# Patient Record
Sex: FEMALE | Race: WHITE | NOT HISPANIC OR LATINO | URBAN - METROPOLITAN AREA
[De-identification: names, ages, dates, MRNs, and addresses within clinical notes are randomized per-mention and may not be internally consistent; named-entity substitution may affect disease eponyms.]

---

## 2024-07-25 ENCOUNTER — OFFICE VISIT (OUTPATIENT)
Dept: BEHAVIORAL HEALTH | Facility: CLINIC | Age: 39
End: 2024-07-25
Payer: COMMERCIAL

## 2024-07-25 VITALS
WEIGHT: 163.8 LBS | HEIGHT: 68 IN | DIASTOLIC BLOOD PRESSURE: 76 MMHG | SYSTOLIC BLOOD PRESSURE: 124 MMHG | BODY MASS INDEX: 24.83 KG/M2

## 2024-07-25 DIAGNOSIS — Z79.899 MEDICATION MANAGEMENT: ICD-10-CM

## 2024-07-25 DIAGNOSIS — F90.2 ATTENTION DEFICIT HYPERACTIVITY DISORDER, COMBINED TYPE: Primary | ICD-10-CM

## 2024-07-25 RX ORDER — THYROID 60 MG/1
1 TABLET ORAL DAILY
COMMUNITY
End: 2024-07-25

## 2024-07-25 RX ORDER — ERGOCALCIFEROL 1.25 MG/1
CAPSULE ORAL
COMMUNITY
Start: 2024-06-14

## 2024-07-25 RX ORDER — THYROID 30 MG/1
1 TABLET ORAL DAILY
COMMUNITY
End: 2024-07-25

## 2024-07-25 RX ORDER — OSELTAMIVIR PHOSPHATE 75 MG/1
CAPSULE ORAL
COMMUNITY
End: 2024-07-25

## 2024-07-25 NOTE — PROGRESS NOTES
Chief Complaint:  ADHD    History of Present Illness: Malaika Espitia is a 39 y.o. female who presents today referred by PCP Anisa ESPARZA. Pt denies feeling depressed. No anhedonia or hopelessness. Pt denies having SI or HI. Pt does have access to firearms. No h/o suicide attempts or self harm. No difficulty sleeping. No symptoms of leslie/hypomania. No excessive worrying. No irritability or feeling on edge. No symptoms of OCD or PTSD. Pt denies AVH. H/o ADHD. Pt is easily forgetful, often misplacing and losing items. Pt c/o difficulty completing tasks, being easily distracted. Pt feels like her mind is very scattered, sometimes feeling overstimulated with lots of noise or with external input. Pt has had difficulty concentrating since childhood. Pt admits to excessive talking, would often move around and be out of her seat during school. Pt admits to procrastination since childhood. Pt had A's and B's in elementary and high school. Pt c/o difficulty retaining information. Pt has had testing accommodations for her board exams with ADHD that include extended time and in a quiet room. Pt reports her  pushed her to make this appointment due to severity of her ADHD being uncontrolled. Pt reports feeling overwhelmed with unaccomplished tasks. Pt reports ADHD symptoms affect her at both home and work.       Medical Record Review: Reviewed office visit note from 4/19/24, discuss referral for behavioral health for ADHD treatment. Was on Adderall XR 20mg prior to having children. Stopped for pregnancy/breast feeding. Provider was in [place], needs someone closer. Pt c/o difficulty concentrating. She has been off meds since 12/2015. H/o hypothyroidism.       PHQ-9 Depression Screening  Little interest or pleasure in doing things? 0-->not at all   Feeling down, depressed, or hopeless? 0-->not at all   Trouble falling or staying asleep, or sleeping too much? 0-->not at all   Feeling tired or having little energy?  1-->several days   Poor appetite or overeating? 0-->not at all   Feeling bad about yourself - or that you are a failure or have let yourself or your family down? 0-->not at all   Trouble concentrating on things, such as reading the newspaper or watching television? 3-->nearly every day   Moving or speaking so slowly that other people could have noticed? Or the opposite - being so fidgety or restless that you have been moving around a lot more than usual? 1-->several days   Thoughts that you would be better off dead, or of hurting yourself in some way? 0-->not at all   PHQ-9 Total Score 5   If you checked off any problems, how difficult have these problems made it for you to do your work, take care of things at home, or get along with other people? not difficult at all         NILO-7  Feeling nervous, anxious or on edge: Several days  Not being able to stop or control worrying: Not at all  Worrying too much about different things: Not at all  Trouble Relaxing: Several days  Being so restless that it is hard to sit still: Not at all  Feeling afraid as if something awful might happen: Not at all  Becoming easily annoyed or irritable: Several days  NILO 7 Total Score: 3      ROS:  Review of Systems   Constitutional:  Positive for fatigue. Negative for appetite change, diaphoresis and unexpected weight change.   HENT:  Negative for drooling, tinnitus and trouble swallowing.    Eyes:  Negative for visual disturbance.   Respiratory:  Negative for cough, chest tightness and shortness of breath.    Cardiovascular:  Negative for chest pain and palpitations.   Gastrointestinal:  Negative for abdominal pain, constipation, diarrhea, nausea and vomiting.   Endocrine: Negative for cold intolerance and heat intolerance.   Genitourinary:  Negative for difficulty urinating.   Musculoskeletal:  Negative for arthralgias and myalgias.   Skin:  Negative for rash.   Allergic/Immunologic: Negative for immunocompromised state.   Neurological:   Negative for dizziness, tremors, seizures and headaches.   Psychiatric/Behavioral:  Positive for decreased concentration. Negative for agitation, dysphoric mood, hallucinations, self-injury, sleep disturbance and suicidal ideas. The patient is not nervous/anxious.        Problem List:  There is no problem list on file for this patient.      Current Medications:   Current Outpatient Medications   Medication Sig Dispense Refill    vitamin D (ERGOCALCIFEROL) 1.25 MG (49065 UT) capsule capsule TAKE 1 capsule BY MOUTH ONCE WEEKLY ON THE SAME DAY       No current facility-administered medications for this visit.       Discontinued Medications:  Medications Discontinued During This Encounter   Medication Reason    oseltamivir (TAMIFLU) 75 MG capsule *Therapy completed    Thyroid (NP THYROID) 30 MG PO tablet *Therapy completed    Thyroid (NP THYROID) 60 MG PO tablet *Therapy completed       Allergy:   No Known Allergies     Past Medical History:  Past Medical History:   Diagnosis Date    ADHD (attention deficit hyperactivity disorder)     Disease of thyroid gland        Past Surgical History:  Past Surgical History:   Procedure Laterality Date     SECTION         Past Psychiatric History:  Began Treatment:   Diagnoses: ADHD (diagnosed by neuropsych testing and psychiatrist)  Psychiatrist: Pt last saw a psychiatrist in .  Therapist: Pt last did therapy in .   Admission History: Denies  Medications/Treatment: Strattera, Adderall  Self Harm: Denies  Suicide Attempts: Denies  Postpartum depression: Denies    Family Psychiatric History:   Diagnoses: Denies  Substance use: Denies  Suicide Attempts/Completions: Denies    Family History   Problem Relation Age of Onset    No Known Problems Mother     No Known Problems Father     No Known Problems Sister     No Known Problems Brother     No Known Problems Maternal Aunt     No Known Problems Paternal Aunt     No Known Problems Maternal Uncle     No Known  Problems Paternal Uncle     No Known Problems Maternal Grandfather     No Known Problems Maternal Grandmother     No Known Problems Paternal Grandfather     No Known Problems Paternal Grandmother     No Known Problems Cousin     No Known Problems Other     ADD / ADHD Neg Hx     Alcohol abuse Neg Hx     Anxiety disorder Neg Hx     Bipolar disorder Neg Hx     Dementia Neg Hx     Depression Neg Hx     Drug abuse Neg Hx     OCD Neg Hx     Paranoid behavior Neg Hx     Schizophrenia Neg Hx     Seizures Neg Hx     Self-Injurious Behavior  Neg Hx     Suicide Attempts Neg Hx        Substance Abuse History:   Alcohol use: Pt will drink 2 glasses of wine a week.   Nicotine: Denies  Illicit Drug Use: Denies  Longest Period Sober: Denies  Rehab/AA/NA: Denies    Social History:  Living Situation: Pt lives with her  and 4 children.   Marital/Relationship History: 10 years+ with . No abuse or trauma.   Children: 7 year old son, 5 year old son, 3 year old daughter, 1 year old daughter.   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   Education: Pt completed high school, has her doctorate's degree.    History: Denies  Legal: Denies    Social History     Socioeconomic History    Marital status:    Tobacco Use    Smoking status: Never    Smokeless tobacco: Never   Vaping Use    Vaping status: Never Used   Substance and Sexual Activity    Alcohol use: Yes     Comment: couple times a week    Drug use: Never    Sexual activity: Yes     Partners: Male       Developmental History:   Place of birth: [place]  Siblings: 2 brothers.   Childhood: Unremarkable. No h/o abuse, trauma, or neglect.       Physical Exam:  Physical Exam    Appearance: Well-groomed with adequate hygiene, appears to be of stated age. Casually and neatly dressed, maintains good eye contact.   Behavior: Appropriate, cooperative. No acute distress.  Motor: No abnormal movements, tics or tremors are noted. No psychomotor agitation or  "retardation.  Speech: Coherent, spontaneous, appropriate with normal rate, volume, rhythm, and tone. Increased reaction time to questions, interrupted before I finished asking question at start of visit. Hyperverbal  Mood: \"I'm good\"  Affect: Full range, appropriate, congruent with spontaneous emotional reactivity. Normal intensity. No emotional blunting.   Thought content: Negative suicidal ideations, negative homicidal ideations. Patient denies any obsession, compulsion, or phobia. No evidence of delusions.  Perceptions: Negative auditory hallucinations, negative visual hallucinations. Pt does not appear to be actively responding to internal stimuli.   Thought process: Logical, goal-directed, coherent, and linear with no evidence of flight of ideas, looseness of associations, thought blocking, or tangentiality. Circumstantiality   Insight/Judgement: Fair/fair  Cognition: Alert and oriented to person, place, and date. Memory intact for recent and remote events. Attention and concentration intact.     Vital Signs:   /76   Ht 172.7 cm (68\")   Wt 74.3 kg (163 lb 12.8 oz)   BMI 24.91 kg/m²      Lab Results:   No visits with results within 12 Month(s) from this visit.   Latest known visit with results is:   No results found for any previous visit.       EKG Results:  ECG 12 Lead    (Results Pending)       Imaging Results:  No Images in the past 120 days found..      Assessment & Plan   Diagnoses and all orders for this visit:    1. Attention deficit hyperactivity disorder, combined type (Primary)    2. Medication management  -     Urine Drug Screen - Urine, Clean Catch; Standing  -     ECG 12 Lead      Patient screened positive for depression based on a PHQ-9 score of 5 on 7/25/2024. Follow-up recommendations include: Suicide Risk Assessment performed and see assessment below .    Presentation seems distant with ADHD.  Patient's reported history, symptoms, presentation/physical exam findings are very consistent " for history of ADHD.  We will plan to start on Adderall XR 15 mg for management of ADHD.  Discussed needing to obtain urine drug screen and EKG and if appropriate will then send medication.  Patient verbalized understanding is agreeable to this plan.  Instructed patient to contact the office for any new or worsening symptoms or any other concerns.  Follow-up in 1 month.  Addressed all questions and concerns.      Visit Diagnoses:    ICD-10-CM ICD-9-CM   1. Attention deficit hyperactivity disorder, combined type  F90.2 314.01   2. Medication management  Z79.899 V58.69       PLAN:  Safety: No acute safety concerns at this time.  Therapy: Declines  Risk Assessment: Risk of self-harm acutely is low to moderate.  Risk factors include mood disorder, access to firearms, and recent psychosocial stressors (pandemic). Protective factors include no family history, no present SI, no history of suicide attempts or self-harm in the past, minimal AODA, healthcare seeking, future orientation, willingness to engage in care.  Risk of self-harm chronically is also low to moderate, but could be further elevated in the event of treatment noncompliance and/or AODA.  Labs/Diagnostics Ordered:   Orders Placed This Encounter   Procedures    Urine Drug Screen - Urine, Clean Catch    ECG 12 Lead     Medications:   No orders of the defined types were placed in this encounter.      Adderall, Risks, benefits, side effects discussed with patient including elevated heart rate, elevated blood pressure, irritability, insomnia, sexual dysfunction, appetite suppressing properties, psychosis.  After discussion of these risks and benefits, the patient voiced understanding and agreed to proceed. Remigio reviewed, UDS ordered, and controlled substance agreement signed & witnessed.      Follow up: F/u in 1 month.       TREATMENT PLAN/GOALS: Continue supportive psychotherapy efforts and medications as indicated. Treatment and medication options discussed  during today's visit. Patient ackowledged and verbally consented to continue with current treatment plan and was educated on the importance of compliance with treatment and follow-up appointments.    MEDICATION ISSUES:  SUZI reviewed as expected.  Discussed medication options and treatment plan of prescribed medication as well as the risks, benefits, and side effects including potential falls, possible impaired driving and metabolic adversities among others. Patient is agreeable to call the office with any worsening of symptoms or onset of side effects. Patient is agreeable to call 911 or go to the nearest ER should he/she begin having SI/HI. No medication side effects or related complaints today.            This document has been electronically signed by Cecilia Samuel PA-C  July 25, 2024 09:18 EDT      Part of this note may be an electronic transcription/translation of spoken language to printed text using the Dragon Dictation System.

## 2024-07-26 ENCOUNTER — TRANSCRIBE ORDERS (OUTPATIENT)
Dept: ADMINISTRATIVE | Facility: HOSPITAL | Age: 39
End: 2024-07-26
Payer: COMMERCIAL

## 2024-07-26 ENCOUNTER — LAB (OUTPATIENT)
Dept: LAB | Facility: HOSPITAL | Age: 39
End: 2024-07-26
Payer: COMMERCIAL

## 2024-07-26 ENCOUNTER — HOSPITAL ENCOUNTER (OUTPATIENT)
Dept: CARDIOLOGY | Facility: HOSPITAL | Age: 39
Discharge: HOME | End: 2024-07-26
Payer: COMMERCIAL

## 2024-07-26 DIAGNOSIS — Z79.899 MEDICATION MANAGEMENT: Primary | ICD-10-CM

## 2024-07-26 DIAGNOSIS — Z79.899 MEDICATION MANAGEMENT: ICD-10-CM

## 2024-07-26 LAB
AMPHET+METHAMPHET UR QL: NEGATIVE
BARBITURATES UR QL SCN: NEGATIVE
BENZODIAZ UR QL SCN: NEGATIVE
CANNABINOIDS SERPL QL: NEGATIVE
COCAINE UR QL: NEGATIVE
FENTANYL UR-MCNC: NEGATIVE NG/ML
METHADONE UR QL SCN: NEGATIVE
OPIATES UR QL: NEGATIVE
OXYCODONE UR QL SCN: NEGATIVE
QT INTERVAL: 371 MS
QTC INTERVAL: 398 MS

## 2024-07-26 PROCEDURE — 80307 DRUG TEST PRSMV CHEM ANLYZR: CPT

## 2024-07-26 PROCEDURE — 93005 ELECTROCARDIOGRAM TRACING: CPT | Performed by: PHYSICIAN ASSISTANT

## 2024-07-30 DIAGNOSIS — F90.2 ATTENTION DEFICIT HYPERACTIVITY DISORDER, COMBINED TYPE: Primary | ICD-10-CM

## 2024-07-30 RX ORDER — DEXTROAMPHETAMINE SACCHARATE, AMPHETAMINE ASPARTATE MONOHYDRATE, DEXTROAMPHETAMINE SULFATE AND AMPHETAMINE SULFATE 3.75; 3.75; 3.75; 3.75 MG/1; MG/1; MG/1; MG/1
15 CAPSULE, EXTENDED RELEASE ORAL EVERY MORNING
Qty: 30 CAPSULE | Refills: 0 | Status: SHIPPED | OUTPATIENT
Start: 2024-07-30 | End: 2024-08-29

## 2024-07-31 ENCOUNTER — TELEPHONE (OUTPATIENT)
Dept: PSYCHIATRY | Facility: CLINIC | Age: 39
End: 2024-07-31
Payer: COMMERCIAL

## 2024-08-04 LAB
QT INTERVAL: 371 MS
QTC INTERVAL: 398 MS

## 2024-08-29 ENCOUNTER — OFFICE VISIT (OUTPATIENT)
Dept: BEHAVIORAL HEALTH | Facility: CLINIC | Age: 39
End: 2024-08-29
Payer: COMMERCIAL

## 2024-08-29 VITALS
HEIGHT: 68 IN | HEART RATE: 85 BPM | SYSTOLIC BLOOD PRESSURE: 112 MMHG | DIASTOLIC BLOOD PRESSURE: 66 MMHG | BODY MASS INDEX: 23.95 KG/M2 | WEIGHT: 158 LBS

## 2024-08-29 DIAGNOSIS — F90.2 ATTENTION DEFICIT HYPERACTIVITY DISORDER, COMBINED TYPE: Primary | ICD-10-CM

## 2024-08-29 RX ORDER — DEXTROAMPHETAMINE SACCHARATE, AMPHETAMINE ASPARTATE MONOHYDRATE, DEXTROAMPHETAMINE SULFATE AND AMPHETAMINE SULFATE 5; 5; 5; 5 MG/1; MG/1; MG/1; MG/1
20 CAPSULE, EXTENDED RELEASE ORAL EVERY MORNING
Qty: 30 CAPSULE | Refills: 0 | Status: SHIPPED | OUTPATIENT
Start: 2024-09-01 | End: 2024-10-01

## 2024-08-29 NOTE — PROGRESS NOTES
Chief Complaint:  ADHD    History of Present Illness: Malaika Espitia is a 39 y.o. female who presents today for f/u of mood.  Patient has been taking Adderall as prescribed and tolerating well without any complications. Pt denies feeling depressed. No anhedonia or hopelessness. Pt denies having SI or HI. No difficulty sleeping. No excessive worrying. No irritability or feeling on edge. Pt is easily forgetful, often misplacing and losing items. Pt c/o difficulty completing tasks, being easily distracted, has improved. Pt feels like her mind is very scattered, sometimes feeling overstimulated with lots of noise or with external input.  Patient reports concentration has improved since last visit. Pt c/o difficulty retaining information. Pt reports feeling overwhelmed with unaccomplished tasks, this has improved as well.       Medical Record Review: Reviewed office visit note from 4/19/24, discuss referral for behavioral health for ADHD treatment. Was on Adderall XR 20mg prior to having children. Stopped for pregnancy/breast feeding. Provider was in [place], needs someone closer. Pt c/o difficulty concentrating. She has been off meds since 12/2015. H/o hypothyroidism.       PHQ-9 Depression Screening  Little interest or pleasure in doing things?     Feeling down, depressed, or hopeless?     Trouble falling or staying asleep, or sleeping too much?     Feeling tired or having little energy?     Poor appetite or overeating?     Feeling bad about yourself - or that you are a failure or have let yourself or your family down?     Trouble concentrating on things, such as reading the newspaper or watching television?     Moving or speaking so slowly that other people could have noticed? Or the opposite - being so fidgety or restless that you have been moving around a lot more than usual?     Thoughts that you would be better off dead, or of hurting yourself in some way?     PHQ-9 Total Score     If you checked off any  problems, how difficult have these problems made it for you to do your work, take care of things at home, or get along with other people?           NILO-7         ROS:  Review of Systems   Constitutional:  Positive for fatigue. Negative for appetite change, diaphoresis and unexpected weight change.   HENT:  Negative for drooling, tinnitus and trouble swallowing.    Eyes:  Negative for visual disturbance.   Respiratory:  Negative for cough, chest tightness and shortness of breath.    Cardiovascular:  Negative for chest pain and palpitations.   Gastrointestinal:  Negative for abdominal pain, constipation, diarrhea, nausea and vomiting.   Endocrine: Negative for cold intolerance and heat intolerance.   Genitourinary:  Negative for difficulty urinating.   Musculoskeletal:  Negative for arthralgias and myalgias.   Skin:  Negative for rash.   Allergic/Immunologic: Negative for immunocompromised state.   Neurological:  Negative for dizziness, tremors, seizures and headaches.   Psychiatric/Behavioral:  Positive for decreased concentration. Negative for agitation, dysphoric mood, hallucinations, self-injury, sleep disturbance and suicidal ideas. The patient is not nervous/anxious.        Problem List:  There is no problem list on file for this patient.      Current Medications:   Current Outpatient Medications   Medication Sig Dispense Refill    amphetamine-dextroamphetamine XR (Adderall XR) 15 MG 24 hr capsule Take 1 capsule by mouth Every Morning for 30 days 30 capsule 0    vitamin D (ERGOCALCIFEROL) 1.25 MG (77823 UT) capsule capsule TAKE 1 capsule BY MOUTH ONCE WEEKLY ON THE SAME DAY       No current facility-administered medications for this visit.       Discontinued Medications:  There are no discontinued medications.      Allergy:   No Known Allergies     Past Medical History:  Past Medical History:   Diagnosis Date    ADHD (attention deficit hyperactivity disorder)     Disease of thyroid gland        Past Surgical  History:  Past Surgical History:   Procedure Laterality Date     SECTION  2016       Past Psychiatric History:  Began Treatment:   Diagnoses: ADHD (diagnosed by neuropsych testing and psychiatrist)  Psychiatrist: Pt last saw a psychiatrist in .  Therapist: Pt last did therapy in .   Admission History: Denies  Medications/Treatment: Strattera, Adderall  Self Harm: Denies  Suicide Attempts: Denies  Postpartum depression: Denies    Family Psychiatric History:   Diagnoses: Denies  Substance use: Denies  Suicide Attempts/Completions: Denies    Family History   Problem Relation Age of Onset    No Known Problems Mother     No Known Problems Father     No Known Problems Sister     No Known Problems Brother     No Known Problems Maternal Aunt     No Known Problems Paternal Aunt     No Known Problems Maternal Uncle     No Known Problems Paternal Uncle     No Known Problems Maternal Grandfather     No Known Problems Maternal Grandmother     No Known Problems Paternal Grandfather     No Known Problems Paternal Grandmother     No Known Problems Cousin     No Known Problems Other     ADD / ADHD Neg Hx     Alcohol abuse Neg Hx     Anxiety disorder Neg Hx     Bipolar disorder Neg Hx     Dementia Neg Hx     Depression Neg Hx     Drug abuse Neg Hx     OCD Neg Hx     Paranoid behavior Neg Hx     Schizophrenia Neg Hx     Seizures Neg Hx     Self-Injurious Behavior  Neg Hx     Suicide Attempts Neg Hx        Substance Abuse History:   Alcohol use: Pt will drink 2 glasses of wine a week.   Nicotine: Denies  Illicit Drug Use: Denies  Longest Period Sober: Denies  Rehab/AA/NA: Denies    Social History:  Living Situation: Pt lives with her  and 4 children.   Marital/Relationship History: 10 years+ with . No abuse or trauma.   Children: 7 year old son, 5 year old son, 3 year old daughter, 1 year old daughter.   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   Education: Pt completed high school,  "has her doctorate's degree.    History: Denies  Legal: Denies    Social History     Socioeconomic History    Marital status:    Tobacco Use    Smoking status: Never    Smokeless tobacco: Never   Vaping Use    Vaping status: Never Used   Substance and Sexual Activity    Alcohol use: Yes     Comment: couple times a week    Drug use: Never    Sexual activity: Yes     Partners: Male       Developmental History:   Place of birth: [place]  Siblings: 2 brothers.   Childhood: Unremarkable. No h/o abuse, trauma, or neglect.       Physical Exam:  Physical Exam    Appearance: Well-groomed with adequate hygiene, appears to be of stated age. Casually and neatly dressed, maintains good eye contact.   Behavior: Appropriate, cooperative. No acute distress.  Motor: No abnormal movements, tics or tremors are noted. No psychomotor agitation or retardation.  Speech: Coherent, spontaneous, appropriate with normal rate, volume, rhythm, and tone.  Normal reaction time to questions.  No hyperverbal or pressured speech  Mood: \"I'm good\"  Affect: Full range, appropriate, congruent with spontaneous emotional reactivity. Normal intensity. No emotional blunting.   Thought content: Negative suicidal ideations, negative homicidal ideations. Patient denies any obsession, compulsion, or phobia. No evidence of delusions.  Perceptions: Negative auditory hallucinations, negative visual hallucinations. Pt does not appear to be actively responding to internal stimuli.   Thought process: Logical, goal-directed, coherent, and linear with no evidence of flight of ideas, looseness of associations, thought blocking, or tangentiality.  Insight/Judgement: Fair/fair  Cognition: Alert and oriented to person, place, and date. Memory intact for recent and remote events. Attention and concentration intact.     Vital Signs:   /66   Pulse 85   Ht 172.7 cm (67.99\")   Wt 71.7 kg (158 lb)   BMI 24.03 kg/m²      Lab Results:   Hospital Outpatient " Visit on 07/26/2024   Component Date Value Ref Range Status    QT Interval 07/26/2024 371  ms Final    QTC Interval 07/26/2024 398  ms Final   Lab on 07/26/2024   Component Date Value Ref Range Status    Amphet/Methamphet, Screen 07/26/2024 Negative  Negative Final    Barbiturates Screen, Urine 07/26/2024 Negative  Negative Final    Benzodiazepine Screen, Urine 07/26/2024 Negative  Negative Final    Cocaine Screen, Urine 07/26/2024 Negative  Negative Final    Opiate Screen 07/26/2024 Negative  Negative Final    THC, Screen, Urine 07/26/2024 Negative  Negative Final    Methadone Screen, Urine 07/26/2024 Negative  Negative Final    Oxycodone Screen, Urine 07/26/2024 Negative  Negative Final    Fentanyl, Urine 07/26/2024 Negative  Negative Final       EKG Results:  No orders to display       Imaging Results:  No Images in the past 120 days found..      Assessment & Plan   Diagnoses and all orders for this visit:    1. Attention deficit hyperactivity disorder, combined type (Primary)        Patient screened positive for depression based on a PHQ-9 score of 5 on 7/25/2024. Follow-up recommendations include: Suicide Risk Assessment performed and see assessment below .    Presentation seems distant with ADHD.  Patient's reported history, symptoms, presentation/physical exam findings are very consistent for history of ADHD.  Patient states she continues to have difficulty concentrating during the day although this has improved.  Will increase Adderall to XR 20 mg for management of ADHD.  Patient was previously well-controlled at this dose in the past. Instructed patient to contact the office for any new or worsening symptoms or any other concerns.  Follow-up in 1 month.  Addressed all questions and concerns.      Visit Diagnoses:    ICD-10-CM ICD-9-CM   1. Attention deficit hyperactivity disorder, combined type  F90.2 314.01         PLAN:  Safety: No acute safety concerns at this time.  Therapy: Declines  Risk Assessment:  Risk of self-harm acutely is low to moderate.  Risk factors include mood disorder, access to firearms, and recent psychosocial stressors (pandemic). Protective factors include no family history, no present SI, no history of suicide attempts or self-harm in the past, minimal AODA, healthcare seeking, future orientation, willingness to engage in care.  Risk of self-harm chronically is also low to moderate, but could be further elevated in the event of treatment noncompliance and/or AODA.  Labs/Diagnostics Ordered:   No orders of the defined types were placed in this encounter.    Medications:   No orders of the defined types were placed in this encounter.      Adderall, Risks, benefits, side effects discussed with patient including elevated heart rate, elevated blood pressure, irritability, insomnia, sexual dysfunction, appetite suppressing properties, psychosis.  After discussion of these risks and benefits, the patient voiced understanding and agreed to proceed. Suzi reviewed, UDS ordered, and controlled substance agreement signed & witnessed.      Follow up: F/u in 1 month.       TREATMENT PLAN/GOALS: Continue supportive psychotherapy efforts and medications as indicated. Treatment and medication options discussed during today's visit. Patient ackowledged and verbally consented to continue with current treatment plan and was educated on the importance of compliance with treatment and follow-up appointments.    MEDICATION ISSUES:  SUZI reviewed as expected.  Discussed medication options and treatment plan of prescribed medication as well as the risks, benefits, and side effects including potential falls, possible impaired driving and metabolic adversities among others. Patient is agreeable to call the office with any worsening of symptoms or onset of side effects. Patient is agreeable to call 911 or go to the nearest ER should he/she begin having SI/HI. No medication side effects or related complaints today.             This document has been electronically signed by Cecilia Samuel PA-C  August 29, 2024 12:21 EDT      Part of this note may be an electronic transcription/translation of spoken language to printed text using the Dragon Dictation System.

## 2024-09-20 ENCOUNTER — TELEMEDICINE (OUTPATIENT)
Dept: BEHAVIORAL HEALTH | Facility: CLINIC | Age: 39
End: 2024-09-20
Payer: COMMERCIAL

## 2024-09-20 DIAGNOSIS — F90.2 ATTENTION DEFICIT HYPERACTIVITY DISORDER, COMBINED TYPE: ICD-10-CM

## 2024-09-20 DIAGNOSIS — F90.2 ATTENTION DEFICIT HYPERACTIVITY DISORDER, COMBINED TYPE: Primary | ICD-10-CM

## 2024-09-20 RX ORDER — DEXTROAMPHETAMINE SACCHARATE, AMPHETAMINE ASPARTATE MONOHYDRATE, DEXTROAMPHETAMINE SULFATE AND AMPHETAMINE SULFATE 5; 5; 5; 5 MG/1; MG/1; MG/1; MG/1
20 CAPSULE, EXTENDED RELEASE ORAL EVERY MORNING
Qty: 30 CAPSULE | Refills: 0 | Status: SHIPPED | OUTPATIENT
Start: 2024-10-03 | End: 2024-11-02

## 2024-11-20 ENCOUNTER — TELEMEDICINE (OUTPATIENT)
Dept: PSYCHIATRY | Facility: CLINIC | Age: 39
End: 2024-11-20
Payer: COMMERCIAL

## 2024-11-20 DIAGNOSIS — F90.2 ATTENTION DEFICIT HYPERACTIVITY DISORDER, COMBINED TYPE: Primary | ICD-10-CM

## 2024-11-20 RX ORDER — DEXTROAMPHETAMINE SACCHARATE, AMPHETAMINE ASPARTATE MONOHYDRATE, DEXTROAMPHETAMINE SULFATE AND AMPHETAMINE SULFATE 5; 5; 5; 5 MG/1; MG/1; MG/1; MG/1
20 CAPSULE, EXTENDED RELEASE ORAL EVERY MORNING
Qty: 30 CAPSULE | Refills: 0 | Status: SHIPPED | OUTPATIENT
Start: 2024-11-29 | End: 2024-12-29

## 2024-11-20 NOTE — PROGRESS NOTES
Mode of Visit: Video  Location of patient: -WORK-  Location of provider: +HOME+  You have chosen to receive care through a telehealth visit.  The patient has signed the video visit consent form.  The visit included audio and video interaction. No technical issues occurred during this visit.    Chief Complaint:  ADHD    History of Present Illness: Malaika Espitia is a 39 y.o. female who presents today for f/u of mood.  Patient has been taking Adderall as prescribed and tolerating well without any complications. Pt denies feeling depressed. No anhedonia or hopelessness. Pt denies having SI or HI. No difficulty sleeping. No excessive worrying. No irritability or feeling on edge. Pt is easily forgetful, often misplacing and losing items, has improved. Pt reports difficulty concentrating, difficulty completing tasks, and procrastination has improved since last visit. Pt has not been as easily distracted. Pt feels like her mind is scattered, has improved, still sometimes feeling overstimulated with lots of noise or with external input, has improved. Pt c/o difficulty retaining information, has been better.    I have reviewed/confirmed previously documented HPI with no changes.         Medical Record Review: Reviewed office visit note from 4/19/24, discuss referral for behavioral health for ADHD treatment. Was on Adderall XR 20mg prior to having children. Stopped for pregnancy/breast feeding. Provider was in [place], needs someone closer. Pt c/o difficulty concentrating. She has been off meds since 12/2015. H/o hypothyroidism.       PHQ-9 Depression Screening  Little interest or pleasure in doing things?     Feeling down, depressed, or hopeless?     Trouble falling or staying asleep, or sleeping too much?     Feeling tired or having little energy?     Poor appetite or overeating?     Feeling bad about yourself - or that you are a failure or have let yourself or your family down?     Trouble concentrating on things, such as  reading the newspaper or watching television?     Moving or speaking so slowly that other people could have noticed? Or the opposite - being so fidgety or restless that you have been moving around a lot more than usual?     Thoughts that you would be better off dead, or of hurting yourself in some way?     PHQ-9 Total Score     If you checked off any problems, how difficult have these problems made it for you to do your work, take care of things at home, or get along with other people?           NILO-7         ROS:  Review of Systems   Constitutional:  Negative for appetite change, diaphoresis, fatigue and unexpected weight change.   HENT:  Negative for drooling, tinnitus and trouble swallowing.    Eyes:  Negative for visual disturbance.   Respiratory:  Negative for cough, chest tightness and shortness of breath.    Cardiovascular:  Negative for chest pain and palpitations.   Gastrointestinal:  Negative for abdominal pain, constipation, diarrhea, nausea and vomiting.   Endocrine: Negative for cold intolerance and heat intolerance.   Genitourinary:  Negative for difficulty urinating.   Musculoskeletal:  Negative for arthralgias and myalgias.   Skin:  Negative for rash.   Allergic/Immunologic: Negative for immunocompromised state.   Neurological:  Negative for dizziness, tremors, seizures and headaches.   Psychiatric/Behavioral:  Negative for agitation, decreased concentration, dysphoric mood, hallucinations, self-injury, sleep disturbance and suicidal ideas. The patient is not nervous/anxious.        Problem List:  There is no problem list on file for this patient.      Current Medications:   Current Outpatient Medications   Medication Sig Dispense Refill    vitamin D (ERGOCALCIFEROL) 1.25 MG (44039 UT) capsule capsule TAKE 1 capsule BY MOUTH ONCE WEEKLY ON THE SAME DAY       No current facility-administered medications for this visit.       Discontinued Medications:  There are no discontinued  medications.      Allergy:   No Known Allergies     Past Medical History:  Past Medical History:   Diagnosis Date    ADHD (attention deficit hyperactivity disorder)     Disease of thyroid gland        Past Surgical History:  Past Surgical History:   Procedure Laterality Date     SECTION  2016       Past Psychiatric History:  Began Treatment:   Diagnoses: ADHD (diagnosed by neuropsych testing and psychiatrist)  Psychiatrist: Pt last saw a psychiatrist in .  Therapist: Pt last did therapy in .   Admission History: Denies  Medications/Treatment: Strattera, Adderall  Self Harm: Denies  Suicide Attempts: Denies  Postpartum depression: Denies    Family Psychiatric History:   Diagnoses: Denies  Substance use: Denies  Suicide Attempts/Completions: Denies    Family History   Problem Relation Age of Onset    No Known Problems Mother     No Known Problems Father     No Known Problems Sister     No Known Problems Brother     No Known Problems Maternal Aunt     No Known Problems Paternal Aunt     No Known Problems Maternal Uncle     No Known Problems Paternal Uncle     No Known Problems Maternal Grandfather     No Known Problems Maternal Grandmother     No Known Problems Paternal Grandfather     No Known Problems Paternal Grandmother     No Known Problems Cousin     No Known Problems Other     ADD / ADHD Neg Hx     Alcohol abuse Neg Hx     Anxiety disorder Neg Hx     Bipolar disorder Neg Hx     Dementia Neg Hx     Depression Neg Hx     Drug abuse Neg Hx     OCD Neg Hx     Paranoid behavior Neg Hx     Schizophrenia Neg Hx     Seizures Neg Hx     Self-Injurious Behavior  Neg Hx     Suicide Attempts Neg Hx        Substance Abuse History:   Alcohol use: Pt will drink 2 glasses of wine a week.   Nicotine: Denies  Illicit Drug Use: Denies  Longest Period Sober: Denies  Rehab/AA/NA: Denies    Social History:  Living Situation: Pt lives with her  and 4 children.   Marital/Relationship History: 10 years+ with  ". No abuse or trauma.   Children: 7 year old son, 5 year old son, 3 year old daughter, 1 year old daughter.   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   Education: Pt completed high school, has her doctorate's degree.    History: Denies  Legal: Denies    Social History     Socioeconomic History    Marital status:    Tobacco Use    Smoking status: Never    Smokeless tobacco: Never   Vaping Use    Vaping status: Never Used   Substance and Sexual Activity    Alcohol use: Yes     Comment: couple times a week    Drug use: Never    Sexual activity: Yes     Partners: Male       Developmental History:   Place of birth: [place]  Siblings: 2 brothers.   Childhood: Unremarkable. No h/o abuse, trauma, or neglect.       Physical Exam:  Physical Exam    Appearance: appears to be of stated age, maintains good eye contact.   Behavior: Appropriate, cooperative. No acute distress.  Motor: No abnormal movements  Speech: Coherent, spontaneous, appropriate with normal rate, volume, rhythm, and tone.  Normal reaction time to questions.  No hyperverbal or pressured speech  Mood: \"I'm good\"  Affect: Full range, appropriate, congruent with spontaneous emotional reactivity. Normal intensity. No emotional blunting.   Thought content: Negative suicidal ideations, negative homicidal ideations. Patient denies any obsession, compulsion, or phobia. No evidence of delusions.  Perceptions: Negative auditory hallucinations, negative visual hallucinations. Pt does not appear to be actively responding to internal stimuli.   Thought process: Logical, goal-directed, coherent, and linear with no evidence of flight of ideas, looseness of associations, thought blocking, or tangentiality.  Insight/Judgement: Fair/fair  Cognition: Alert and oriented to person, place, and date. Memory intact for recent and remote events. Attention and concentration intact.     I have reexamined the patient and the results are consistent " with the previously documented exam. Cecilia Samuel PA-C         Vital Signs:   There were no vitals taken for this visit.     Lab Results:   Hospital Outpatient Visit on 07/26/2024   Component Date Value Ref Range Status    QT Interval 07/26/2024 371  ms Final    QTC Interval 07/26/2024 398  ms Final   Lab on 07/26/2024   Component Date Value Ref Range Status    Amphet/Methamphet, Screen 07/26/2024 Negative  Negative Final    Barbiturates Screen, Urine 07/26/2024 Negative  Negative Final    Benzodiazepine Screen, Urine 07/26/2024 Negative  Negative Final    Cocaine Screen, Urine 07/26/2024 Negative  Negative Final    Opiate Screen 07/26/2024 Negative  Negative Final    THC, Screen, Urine 07/26/2024 Negative  Negative Final    Methadone Screen, Urine 07/26/2024 Negative  Negative Final    Oxycodone Screen, Urine 07/26/2024 Negative  Negative Final    Fentanyl, Urine 07/26/2024 Negative  Negative Final       EKG Results:  No orders to display       Imaging Results:  No Images in the past 120 days found..      Assessment & Plan   Diagnoses and all orders for this visit:    1. Attention deficit hyperactivity disorder, combined type (Primary)            Patient screened positive for depression based on a PHQ-9 score of  on . Follow-up recommendations include: Suicide Risk Assessment performed and see assessment below .    Presentation seems most consistent with ADHD.  We will continue Adderall XR 20 mg for management of ADHD.  Instructed patient to contact the office for any new or worsening symptoms or any other concerns.  Follow-up in 3 months.  Addressed all questions and concerns.    I have reviewed the assessment and plan and verified the accuracy of it. No changes to assessment and plan since the information was documented. Cecilia Samuel PA-C 11/20/24       Visit Diagnoses:    ICD-10-CM ICD-9-CM   1. Attention deficit hyperactivity disorder, combined type  F90.2 314.01             PLAN:  Safety: No acute safety  concerns at this time.  Therapy: Declines  Risk Assessment: Risk of self-harm acutely is low to moderate.  Risk factors include mood disorder, access to firearms, and recent psychosocial stressors (pandemic). Protective factors include no family history, no present SI, no history of suicide attempts or self-harm in the past, minimal AODA, healthcare seeking, future orientation, willingness to engage in care.  Risk of self-harm chronically is also low to moderate, but could be further elevated in the event of treatment noncompliance and/or AODA.  Labs/Diagnostics Ordered:   No orders of the defined types were placed in this encounter.    Medications:   No orders of the defined types were placed in this encounter.      Adderall, Risks, benefits, side effects discussed with patient including elevated heart rate, elevated blood pressure, irritability, insomnia, sexual dysfunction, appetite suppressing properties, psychosis.  After discussion of these risks and benefits, the patient voiced understanding and agreed to proceed. Suzi reviewed, UDS ordered, and controlled substance agreement signed & witnessed.      Follow up: F/u in 3 months      TREATMENT PLAN/GOALS: Continue supportive psychotherapy efforts and medications as indicated. Treatment and medication options discussed during today's visit. Patient ackowledged and verbally consented to continue with current treatment plan and was educated on the importance of compliance with treatment and follow-up appointments.    MEDICATION ISSUES:  SUZI reviewed as expected.  Discussed medication options and treatment plan of prescribed medication as well as the risks, benefits, and side effects including potential falls, possible impaired driving and metabolic adversities among others. Patient is agreeable to call the office with any worsening of symptoms or onset of side effects. Patient is agreeable to call 911 or go to the nearest ER should he/she begin having SI/HI.  No medication side effects or related complaints today.            This document has been electronically signed by Cecilia Samuel PA-C  November 20, 2024 11:10 EST      Part of this note may be an electronic transcription/translation of spoken language to printed text using the Dragon Dictation System.

## 2025-02-13 ENCOUNTER — TELEMEDICINE (OUTPATIENT)
Dept: BEHAVIORAL HEALTH | Facility: CLINIC | Age: 40
End: 2025-02-13
Payer: COMMERCIAL

## 2025-02-13 DIAGNOSIS — F90.2 ATTENTION DEFICIT HYPERACTIVITY DISORDER, COMBINED TYPE: Primary | ICD-10-CM

## 2025-02-13 RX ORDER — DEXTROAMPHETAMINE SACCHARATE, AMPHETAMINE ASPARTATE MONOHYDRATE, DEXTROAMPHETAMINE SULFATE AND AMPHETAMINE SULFATE 5; 5; 5; 5 MG/1; MG/1; MG/1; MG/1
20 CAPSULE, EXTENDED RELEASE ORAL EVERY MORNING
Qty: 30 CAPSULE | Refills: 0 | Status: SHIPPED | OUTPATIENT
Start: 2025-02-13 | End: 2025-03-15

## 2025-02-13 NOTE — PROGRESS NOTES
Mode of Visit: Video  Location of patient: -WORK-  Location of provider: +[facility]+  You have chosen to receive care through a telehealth visit.  The patient has signed the video visit consent form.  The visit included audio and video interaction. No technical issues occurred during this visit.    Chief Complaint:  ADHD    History of Present Illness: Malaika Espitia is a 39 y.o. female who presents today for f/u of mood.  Patient has been taking Adderall as prescribed and tolerating well without any complications. Pt denies feeling depressed. No anhedonia or hopelessness. Pt denies having SI or HI. No difficulty sleeping. No excessive worrying. No irritability or feeling on edge. Pt is easily forgetful, often misplacing and losing items, has been better. Pt denies difficulty concentrating, procrastination, difficulty completing tasks. Pt reports being easily distracted is improving. Pt feels like her mind is scattered, has improved, still sometimes feeling overstimulated with lots of noise or with external input, has improved. Pt c/o difficulty retaining information, has been better. No change in appetite or weight.  Patient reports ADHD is well controlled.    I have reviewed/confirmed previously documented HPI with no changes.     Answers submitted by the patient for this visit:  Problem not listed (Submitted on 2/13/2025)  Chief Complaint: Other medical problem  Reason for appointment: Adhd  anorexia: No  joint pain: No  change in stool: No  joint swelling: No  swollen glands: No  vertigo: No  visual change: No  Onset: more than 5 years  Chronicity: chronic  Frequency: daily  Medications tried: Adderall        Medical Record Review: Reviewed office visit note from 4/19/24, discuss referral for behavioral health for ADHD treatment. Was on Adderall XR 20mg prior to having children. Stopped for pregnancy/breast feeding. Provider was in [place], needs someone closer. Pt c/o difficulty concentrating. She has been off  meds since 12/2015. H/o hypothyroidism.       PHQ-9 Depression Screening  Little interest or pleasure in doing things? (Patient-Rptd) Not at all   Feeling down, depressed, or hopeless? (Patient-Rptd) Not at all   PHQ-2 Total Score (Patient-Rptd) 0   Trouble falling or staying asleep, or sleeping too much? (Patient-Rptd) Not at all   Feeling tired or having little energy? (Patient-Rptd) Several days   Poor appetite or overeating? (Patient-Rptd) Not at all   Feeling bad about yourself - or that you are a failure or have let yourself or your family down? (Patient-Rptd) Not at all   Trouble concentrating on things, such as reading the newspaper or watching television? (Patient-Rptd) Not at all   Moving or speaking so slowly that other people could have noticed? Or the opposite - being so fidgety or restless that you have been moving around a lot more than usual? (Patient-Rptd) Not at all   Thoughts that you would be better off dead, or of hurting yourself in some way? (Patient-Rptd) Not at all   PHQ-9 Total Score (Patient-Rptd) 1   If you checked off any problems, how difficult have these problems made it for you to do your work, take care of things at home, or get along with other people? (Patient-Rptd) Not difficult at all           NILO-7  Feeling nervous, anxious or on edge: (Patient-Rptd) Several days  Not being able to stop or control worrying: (Patient-Rptd) Not at all  Worrying too much about different things: (Patient-Rptd) Not at all  Trouble Relaxing: (Patient-Rptd) Not at all  Being so restless that it is hard to sit still: (Patient-Rptd) Not at all  Feeling afraid as if something awful might happen: (Patient-Rptd) Not at all  Becoming easily annoyed or irritable: (Patient-Rptd) Not at all  NILO 7 Total Score: (Patient-Rptd) 1  If you checked any problems, how difficult have these problems made it for you to do your work, take care of things at home, or get along with other people: (Patient-Rptd) Not difficult  at all      ROS:  Review of Systems   Constitutional:  Negative for appetite change, chills, diaphoresis, fatigue, fever and unexpected weight change.   HENT:  Negative for congestion, drooling, sore throat, tinnitus and trouble swallowing.    Eyes:  Negative for visual disturbance.   Respiratory:  Negative for cough, chest tightness and shortness of breath.    Cardiovascular:  Negative for chest pain and palpitations.   Gastrointestinal:  Negative for abdominal pain, constipation, diarrhea, nausea and vomiting.   Endocrine: Negative for cold intolerance and heat intolerance.   Genitourinary:  Negative for difficulty urinating and dysuria.   Musculoskeletal:  Negative for arthralgias, myalgias and neck pain.   Skin:  Negative for rash.   Allergic/Immunologic: Negative for immunocompromised state.   Neurological:  Negative for dizziness, tremors, seizures, weakness, numbness and headaches.   Psychiatric/Behavioral:  Negative for agitation, decreased concentration, dysphoric mood, hallucinations, self-injury, sleep disturbance and suicidal ideas. The patient is not nervous/anxious.        Problem List:  There is no problem list on file for this patient.      Current Medications:   Current Outpatient Medications   Medication Sig Dispense Refill    vitamin D (ERGOCALCIFEROL) 1.25 MG (61618 UT) capsule capsule TAKE 1 capsule BY MOUTH ONCE WEEKLY ON THE SAME DAY       No current facility-administered medications for this visit.       Discontinued Medications:  There are no discontinued medications.      Allergy:   No Known Allergies     Past Medical History:  Past Medical History:   Diagnosis Date    ADHD (attention deficit hyperactivity disorder)     Disease of thyroid gland        Past Surgical History:  Past Surgical History:   Procedure Laterality Date     SECTION  2016       Past Psychiatric History:  Began Treatment:   Diagnoses: ADHD (diagnosed by neuropsych testing and psychiatrist)  Psychiatrist: [facility]  last saw a psychiatrist in 2010.  Therapist: Pt last did therapy in 2005.   Admission History: Denies  Medications/Treatment: Strattera, Adderall  Self Harm: Denies  Suicide Attempts: Denies  Postpartum depression: Denies    Family Psychiatric History:   Diagnoses: Denies  Substance use: Denies  Suicide Attempts/Completions: Denies    Family History   Problem Relation Age of Onset    No Known Problems Mother     No Known Problems Father     No Known Problems Sister     No Known Problems Brother     No Known Problems Maternal Aunt     No Known Problems Paternal Aunt     No Known Problems Maternal Uncle     No Known Problems Paternal Uncle     No Known Problems Maternal Grandfather     No Known Problems Maternal Grandmother     No Known Problems Paternal Grandfather     No Known Problems Paternal Grandmother     No Known Problems Cousin     No Known Problems Other     ADD / ADHD Neg Hx     Alcohol abuse Neg Hx     Anxiety disorder Neg Hx     Bipolar disorder Neg Hx     Dementia Neg Hx     Depression Neg Hx     Drug abuse Neg Hx     OCD Neg Hx     Paranoid behavior Neg Hx     Schizophrenia Neg Hx     Seizures Neg Hx     Self-Injurious Behavior  Neg Hx     Suicide Attempts Neg Hx        Substance Abuse History:   Alcohol use: Pt will drink 2 glasses of wine a week.   Nicotine: Denies  Illicit Drug Use: Denies  Longest Period Sober: Denies  Rehab/AA/NA: Denies    Social History:  Living Situation: Pt lives with her  and 4 children.   Marital/Relationship History: 10 years+ with . No abuse or trauma.   Children: 7 year old son, 5 year old son, 3 year old daughter, 1 year old daughter.   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   Education: Pt completed high school, has her doctorate's degree.    History: Denies  Legal: Denies    Social History     Socioeconomic History    Marital status:    Tobacco Use    Smoking status: Never    Smokeless tobacco: Never   Vaping Use     "Vaping status: Never Used   Substance and Sexual Activity    Alcohol use: Yes     Comment: couple times a week    Drug use: Never    Sexual activity: Yes     Partners: Male       Developmental History:   Place of birth: [place]  Siblings: 2 brothers.   Childhood: Unremarkable. No h/o abuse, trauma, or neglect.       Physical Exam:  Physical Exam    Appearance: appears to be of stated age, maintains good eye contact.   Behavior: Appropriate, cooperative. No acute distress.  Motor: No abnormal movements  Speech: Coherent, spontaneous, appropriate with normal rate, volume, rhythm, and tone.  Normal reaction time to questions.  No hyperverbal or pressured speech  Mood: \"I'm good\"  Affect: Full range, appropriate, congruent with spontaneous emotional reactivity. Normal intensity. No emotional blunting.  Euthymic  Thought content: Negative suicidal ideations, negative homicidal ideations. Patient denies any obsession, compulsion, or phobia. No evidence of delusions.  Perceptions: Negative auditory hallucinations, negative visual hallucinations. Pt does not appear to be actively responding to internal stimuli.   Thought process: Logical, goal-directed, coherent, and linear with no evidence of flight of ideas, looseness of associations, thought blocking, or tangentiality.  Insight/Judgement: Fair/fair  Cognition: Alert and oriented to person, place, and date. Memory intact for recent and remote events. Attention and concentration intact.     I have reexamined the patient and the results are consistent with the previously documented exam. Cecilia Samuel PA-C         Vital Signs:   There were no vitals taken for this visit.     Lab Results:   Hospital Outpatient Visit on 07/26/2024   Component Date Value Ref Range Status    QT Interval 07/26/2024 371  ms Final    QTC Interval 07/26/2024 398  ms Final   Lab on 07/26/2024   Component Date Value Ref Range Status    Amphet/Methamphet, Screen 07/26/2024 Negative  Negative Final "    Barbiturates Screen, Urine 07/26/2024 Negative  Negative Final    Benzodiazepine Screen, Urine 07/26/2024 Negative  Negative Final    Cocaine Screen, Urine 07/26/2024 Negative  Negative Final    Opiate Screen 07/26/2024 Negative  Negative Final    THC, Screen, Urine 07/26/2024 Negative  Negative Final    Methadone Screen, Urine 07/26/2024 Negative  Negative Final    Oxycodone Screen, Urine 07/26/2024 Negative  Negative Final    Fentanyl, Urine 07/26/2024 Negative  Negative Final       EKG Results:  No orders to display       Imaging Results:  No Images in the past 120 days found..      Assessment & Plan   Diagnoses and all orders for this visit:    1. Attention deficit hyperactivity disorder, combined type (Primary)          Patient screened positive for depression based on a PHQ-9 score of 1 on 2/13/2025. Follow-up recommendations include: Suicide Risk Assessment performed and see assessment below .    Presentation seems most consistent with ADHD.  We will continue Adderall XR 20 mg for management of ADHD.  Instructed patient to contact the office for any new or worsening symptoms or any other concerns.  Follow-up in 3 months.  Addressed all questions and concerns.    I have reviewed the assessment and plan and verified the accuracy of it. No changes to assessment and plan since the information was documented. Cecilia Samuel PA-C 02/13/25         Visit Diagnoses:    ICD-10-CM ICD-9-CM   1. Attention deficit hyperactivity disorder, combined type  F90.2 314.01           PLAN:  Safety: No acute safety concerns at this time.  Therapy: Declines  Risk Assessment: Risk of self-harm acutely is low to moderate.  Risk factors include mood disorder, access to firearms, and recent psychosocial stressors (pandemic). Protective factors include no family history, no present SI, no history of suicide attempts or self-harm in the past, minimal AODA, healthcare seeking, future orientation, willingness to engage in care.  Risk of  self-harm chronically is also low to moderate, but could be further elevated in the event of treatment noncompliance and/or AODA.  Labs/Diagnostics Ordered:   No orders of the defined types were placed in this encounter.    Medications:   No orders of the defined types were placed in this encounter.      Adderall, Risks, benefits, side effects discussed with patient including elevated heart rate, elevated blood pressure, irritability, insomnia, sexual dysfunction, appetite suppressing properties, psychosis.  After discussion of these risks and benefits, the patient voiced understanding and agreed to proceed. Suzi reviewed, UDS ordered, and controlled substance agreement signed & witnessed.      Follow up: F/u in 3 months      TREATMENT PLAN/GOALS: Continue supportive psychotherapy efforts and medications as indicated. Treatment and medication options discussed during today's visit. Patient ackowledged and verbally consented to continue with current treatment plan and was educated on the importance of compliance with treatment and follow-up appointments.    MEDICATION ISSUES:  SUZI reviewed as expected.  Discussed medication options and treatment plan of prescribed medication as well as the risks, benefits, and side effects including potential falls, possible impaired driving and metabolic adversities among others. Patient is agreeable to call the office with any worsening of symptoms or onset of side effects. Patient is agreeable to call 911 or go to the nearest ER should he/she begin having SI/HI. No medication side effects or related complaints today.            This document has been electronically signed by Cecilia Samuel PA-C  February 13, 2025 11:12 EST      Part of this note may be an electronic transcription/translation of spoken language to printed text using the Dragon Dictation System.

## 2025-05-06 ENCOUNTER — TELEMEDICINE (OUTPATIENT)
Dept: BEHAVIORAL HEALTH | Facility: CLINIC | Age: 40
End: 2025-05-06
Payer: COMMERCIAL

## 2025-05-06 DIAGNOSIS — Z79.899 MEDICATION MANAGEMENT: ICD-10-CM

## 2025-05-06 DIAGNOSIS — F90.2 ATTENTION DEFICIT HYPERACTIVITY DISORDER, COMBINED TYPE: Primary | ICD-10-CM

## 2025-05-06 RX ORDER — DEXTROAMPHETAMINE SACCHARATE, AMPHETAMINE ASPARTATE MONOHYDRATE, DEXTROAMPHETAMINE SULFATE AND AMPHETAMINE SULFATE 5; 5; 5; 5 MG/1; MG/1; MG/1; MG/1
20 CAPSULE, EXTENDED RELEASE ORAL EVERY MORNING
Qty: 30 CAPSULE | Refills: 0 | Status: SHIPPED | OUTPATIENT
Start: 2025-05-06 | End: 2025-06-05

## 2025-05-06 NOTE — PROGRESS NOTES
Mode of Visit: Video  Location of patient: -WORK-  Location of provider: +[facility]  You have chosen to receive care through a telehealth visit.  The patient has signed the video visit consent form.  The visit included audio and video interaction. No technical issues occurred during this visit.    Chief Complaint:  ADHD    History of Present Illness: Malaika Espitia is a 40 y.o. female who presents today for f/u of mood.  Patient has been taking Adderall as prescribed and tolerating well without any complications. Pt denies feeling depressed. No anhedonia or hopelessness. Pt denies having SI or HI. No difficulty sleeping. No excessive worrying. No irritability or feeling on edge. Pt denies difficulty concentrating, procrastination, difficulty starting/completing tasks. Pt reports being easily distracted is improving. Pt has been able to retaining information without difficult. No change in appetite or weight.  Patient reports ADHD is well controlled.    I have reviewed/confirmed previously documented HPI with no changes.     Answers submitted by the patient for this visit:  Problem not listed (Submitted on 5/6/2025)  Chief Complaint: Other medical problem  Reason for appointment: ADHD  anorexia: No  joint pain: No  change in stool: No  joint swelling: No  swollen glands: No  vertigo: No  visual change: No  Onset: more than 5 years  Chronicity: chronic  Frequency: constantly  Medications tried: Adderall xr      Medical Record Review: Reviewed office visit note from 4/19/24, discuss referral for behavioral health for ADHD treatment. Was on Adderall XR 20mg prior to having children. Stopped for pregnancy/breast feeding. Provider was in [place], needs someone closer. Pt c/o difficulty concentrating. She has been off meds since 12/2015. H/o hypothyroidism.       PHQ-9 Depression Screening  Little interest or pleasure in doing things? (Patient-Rptd) Not at all   Feeling down, depressed, or hopeless? (Patient-Rptd) Not at  all   PHQ-2 Total Score (Patient-Rptd) 0   Trouble falling or staying asleep, or sleeping too much? (Patient-Rptd) Not at all   Feeling tired or having little energy? (Patient-Rptd) Several days   Poor appetite or overeating? (Patient-Rptd) Not at all   Feeling bad about yourself - or that you are a failure or have let yourself or your family down? (Patient-Rptd) Not at all   Trouble concentrating on things, such as reading the newspaper or watching television? (Patient-Rptd) Not at all   Moving or speaking so slowly that other people could have noticed? Or the opposite - being so fidgety or restless that you have been moving around a lot more than usual? (Patient-Rptd) Not at all   Thoughts that you would be better off dead, or of hurting yourself in some way? (Patient-Rptd) Not at all   PHQ-9 Total Score (Patient-Rptd) 1   If you checked off any problems, how difficult have these problems made it for you to do your work, take care of things at home, or get along with other people? (Patient-Rptd) Not difficult at all           NILO-7  Feeling nervous, anxious or on edge: (Patient-Rptd) Several days  Not being able to stop or control worrying: (Patient-Rptd) Not at all  Worrying too much about different things: (Patient-Rptd) Not at all  Trouble Relaxing: (Patient-Rptd) Several days  Being so restless that it is hard to sit still: (Patient-Rptd) Not at all  Feeling afraid as if something awful might happen: (Patient-Rptd) Not at all  Becoming easily annoyed or irritable: (Patient-Rptd) Several days  NILO 7 Total Score: (Patient-Rptd) 3  If you checked any problems, how difficult have these problems made it for you to do your work, take care of things at home, or get along with other people: (Patient-Rptd) Not difficult at all      ROS:  Review of Systems   Constitutional:  Negative for appetite change, chills, diaphoresis, fatigue, fever and unexpected weight change.   HENT:  Negative for congestion, drooling, sore  throat, tinnitus and trouble swallowing.    Eyes:  Negative for visual disturbance.   Respiratory:  Negative for cough, chest tightness and shortness of breath.    Cardiovascular:  Negative for chest pain and palpitations.   Gastrointestinal:  Negative for abdominal pain, constipation, diarrhea, nausea and vomiting.   Endocrine: Negative for cold intolerance and heat intolerance.   Genitourinary:  Negative for difficulty urinating and dysuria.   Musculoskeletal:  Negative for arthralgias, myalgias and neck pain.   Skin:  Negative for rash.   Allergic/Immunologic: Negative for immunocompromised state.   Neurological:  Negative for dizziness, tremors, seizures, weakness, numbness and headaches.   Psychiatric/Behavioral:  Negative for agitation, decreased concentration, dysphoric mood, hallucinations, self-injury, sleep disturbance and suicidal ideas. The patient is not nervous/anxious.        Problem List:  There is no problem list on file for this patient.      Current Medications:   Current Outpatient Medications   Medication Sig Dispense Refill    vitamin D (ERGOCALCIFEROL) 1.25 MG (15553 UT) capsule capsule TAKE 1 capsule BY MOUTH ONCE WEEKLY ON THE SAME DAY       No current facility-administered medications for this visit.       Discontinued Medications:  There are no discontinued medications.      Allergy:   No Known Allergies     Past Medical History:  Past Medical History:   Diagnosis Date    ADHD (attention deficit hyperactivity disorder)     Disease of thyroid gland        Past Surgical History:  Past Surgical History:   Procedure Laterality Date     SECTION  2016       Past Psychiatric History:  Began Treatment:   Diagnoses: ADHD (diagnosed by neuropsych testing and psychiatrist)  Psychiatrist: Pt last saw a psychiatrist in .  Therapist: Pt last did therapy in .   Admission History: Denies  Medications/Treatment: Strattera, Adderall  Self Harm: Denies  Suicide Attempts:  Denies  Postpartum depression: Denies    Family Psychiatric History:   Diagnoses: Denies  Substance use: Denies  Suicide Attempts/Completions: Denies    Family History   Problem Relation Age of Onset    No Known Problems Mother     No Known Problems Father     No Known Problems Sister     No Known Problems Brother     No Known Problems Maternal Aunt     No Known Problems Paternal Aunt     No Known Problems Maternal Uncle     No Known Problems Paternal Uncle     No Known Problems Maternal Grandfather     No Known Problems Maternal Grandmother     No Known Problems Paternal Grandfather     No Known Problems Paternal Grandmother     No Known Problems Cousin     No Known Problems Other     ADD / ADHD Neg Hx     Alcohol abuse Neg Hx     Anxiety disorder Neg Hx     Bipolar disorder Neg Hx     Dementia Neg Hx     Depression Neg Hx     Drug abuse Neg Hx     OCD Neg Hx     Paranoid behavior Neg Hx     Schizophrenia Neg Hx     Seizures Neg Hx     Self-Injurious Behavior  Neg Hx     Suicide Attempts Neg Hx        Substance Abuse History:   Alcohol use: Pt will drink 2 glasses of wine a week.   Nicotine: Denies  Illicit Drug Use: Denies  Longest Period Sober: Denies  Rehab/AA/NA: Denies    Social History:  Living Situation: Pt lives with her  and 4 children.   Marital/Relationship History: 10 years+ with . No abuse or trauma.   Children: 7 year old son, 5 year old son, 3 year old daughter, 1 year old daughter.   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   Education: Pt completed high school, has her doctorate's degree.    History: Denies  Legal: Denies    Social History     Socioeconomic History    Marital status:    Tobacco Use    Smoking status: Never    Smokeless tobacco: Never   Vaping Use    Vaping status: Never Used   Substance and Sexual Activity    Alcohol use: Yes     Comment: couple times a week    Drug use: Never    Sexual activity: Yes     Partners: Male  "      Developmental History:   Place of birth: [place]  Siblings: 2 brothers.   Childhood: Unremarkable. No h/o abuse, trauma, or neglect.       Physical Exam:  Physical Exam    Appearance: appears to be of stated age, maintains good eye contact.   Behavior: Appropriate, cooperative. No acute distress.  Motor: No abnormal movements  Speech: Coherent, spontaneous, appropriate with normal rate, volume, rhythm, and tone.  Normal reaction time to questions.  No hyperverbal or pressured speech  Mood: \"I'm good\"  Affect: Full range, appropriate, congruent with spontaneous emotional reactivity. Normal intensity. No emotional blunting.    Thought content: Negative suicidal ideations, negative homicidal ideations. Patient denies any obsession, compulsion, or phobia. No evidence of delusions.  Perceptions: Negative auditory hallucinations, negative visual hallucinations. Pt does not appear to be actively responding to internal stimuli.   Thought process: Logical, goal-directed, coherent, and linear with no evidence of flight of ideas, looseness of associations, thought blocking, or tangentiality.  Insight/Judgement: Fair/fair  Cognition: Alert and oriented to person, place, and date. Memory intact for recent and remote events. Attention and concentration intact.     I have reexamined the patient and the results are consistent with the previously documented exam. Cecilia Samuel PA-C           Vital Signs:   There were no vitals taken for this visit.     Lab Results:   Hospital Outpatient Visit on 07/26/2024   Component Date Value Ref Range Status    QT Interval 07/26/2024 371  ms Final    QTC Interval 07/26/2024 398  ms Final   Lab on 07/26/2024   Component Date Value Ref Range Status    Amphet/Methamphet, Screen 07/26/2024 Negative  Negative Final    Barbiturates Screen, Urine 07/26/2024 Negative  Negative Final    Benzodiazepine Screen, Urine 07/26/2024 Negative  Negative Final    Cocaine Screen, Urine 07/26/2024 Negative  " Negative Final    Opiate Screen 07/26/2024 Negative  Negative Final    THC, Screen, Urine 07/26/2024 Negative  Negative Final    Methadone Screen, Urine 07/26/2024 Negative  Negative Final    Oxycodone Screen, Urine 07/26/2024 Negative  Negative Final    Fentanyl, Urine 07/26/2024 Negative  Negative Final       EKG Results:  No orders to display       Imaging Results:  No Images in the past 120 days found..      Assessment & Plan   Diagnoses and all orders for this visit:    1. Attention deficit hyperactivity disorder, combined type (Primary)    2. Medication management  -     Urine Drug Screen - Urine, Clean Catch; Future            Patient screened positive for depression based on a PHQ-9 score of 1 on 5/6/2025. Follow-up recommendations include: Suicide Risk Assessment performed and see assessment below .    Dx: ADHD.  We will continue Adderall XR 20 mg for management of ADHD.  Instructed patient to contact the office for any new or worsening symptoms or any other concerns.  Follow-up in 3 months per patient request.  Addressed all questions and concerns.    I have reviewed the assessment and plan and verified the accuracy of it. No changes to assessment and plan since the information was documented. Cecilia Samuel PA-C 05/06/25           Visit Diagnoses:    ICD-10-CM ICD-9-CM   1. Attention deficit hyperactivity disorder, combined type  F90.2 314.01   2. Medication management  Z79.899 V58.69             PLAN:  Safety: No acute safety concerns at this time.  Therapy: Declines  Risk Assessment: Risk of self-harm acutely is low to moderate.  Risk factors include mood disorder, access to firearms, and recent psychosocial stressors (pandemic). Protective factors include no family history, no present SI, no history of suicide attempts or self-harm in the past, minimal AODA, healthcare seeking, future orientation, willingness to engage in care.  Risk of self-harm chronically is also low to moderate, but could be  further elevated in the event of treatment noncompliance and/or AODA.  Labs/Diagnostics Ordered:   Orders Placed This Encounter   Procedures    Urine Drug Screen - Urine, Clean Catch     Medications:   No orders of the defined types were placed in this encounter.      Adderall, Risks, benefits, side effects discussed with patient including elevated heart rate, elevated blood pressure, irritability, insomnia, sexual dysfunction, appetite suppressing properties, psychosis.  After discussion of these risks and benefits, the patient voiced understanding and agreed to proceed. Suzi reviewed, UDS ordered, and controlled substance agreement signed & witnessed.      Follow up: F/u in 3 months      TREATMENT PLAN/GOALS: Continue supportive psychotherapy efforts and medications as indicated. Treatment and medication options discussed during today's visit. Patient ackowledged and verbally consented to continue with current treatment plan and was educated on the importance of compliance with treatment and follow-up appointments.    MEDICATION ISSUES:  USZI reviewed as expected.  Discussed medication options and treatment plan of prescribed medication as well as the risks, benefits, and side effects including potential falls, possible impaired driving and metabolic adversities among others. Patient is agreeable to call the office with any worsening of symptoms or onset of side effects. Patient is agreeable to call 911 or go to the nearest ER should he/she begin having SI/HI. No medication side effects or related complaints today.            This document has been electronically signed by Cecilia Samuel PA-C  May 6, 2025 11:13 EDT      Part of this note may be an electronic transcription/translation of spoken language to printed text using the Dragon Dictation System.

## 2025-07-10 ENCOUNTER — TELEPHONE (OUTPATIENT)
Dept: PSYCHIATRY | Facility: CLINIC | Age: 40
End: 2025-07-10
Payer: COMMERCIAL

## 2025-07-29 ENCOUNTER — LAB (OUTPATIENT)
Dept: LAB | Facility: HOSPITAL | Age: 40
End: 2025-07-29
Payer: COMMERCIAL

## 2025-07-29 DIAGNOSIS — Z79.899 MEDICATION MANAGEMENT: ICD-10-CM

## 2025-07-29 LAB
AMPHET+METHAMPHET UR QL: POSITIVE
AMPHETAMINES UR QL: NEGATIVE
BARBITURATES UR QL SCN: NEGATIVE
BENZODIAZ UR QL SCN: NEGATIVE
BUPRENORPHINE SERPL-MCNC: NEGATIVE NG/ML
CANNABINOIDS SERPL QL: NEGATIVE
COCAINE UR QL: NEGATIVE
FENTANYL UR-MCNC: NEGATIVE NG/ML
METHADONE UR QL SCN: NEGATIVE
OPIATES UR QL: NEGATIVE
OXYCODONE UR QL SCN: NEGATIVE
PCP UR QL SCN: NEGATIVE
TRICYCLICS UR QL SCN: NEGATIVE

## 2025-07-29 PROCEDURE — 80307 DRUG TEST PRSMV CHEM ANLYZR: CPT

## 2025-07-30 DIAGNOSIS — F90.2 ATTENTION DEFICIT HYPERACTIVITY DISORDER, COMBINED TYPE: Primary | ICD-10-CM

## 2025-07-30 RX ORDER — DEXTROAMPHETAMINE SACCHARATE, AMPHETAMINE ASPARTATE MONOHYDRATE, DEXTROAMPHETAMINE SULFATE AND AMPHETAMINE SULFATE 5; 5; 5; 5 MG/1; MG/1; MG/1; MG/1
20 CAPSULE, EXTENDED RELEASE ORAL EVERY MORNING
Qty: 30 CAPSULE | Refills: 0 | Status: SHIPPED | OUTPATIENT
Start: 2025-07-30 | End: 2025-08-29

## 2025-08-04 ENCOUNTER — TELEMEDICINE (OUTPATIENT)
Dept: BEHAVIORAL HEALTH | Facility: CLINIC | Age: 40
End: 2025-08-04
Payer: COMMERCIAL

## 2025-08-04 DIAGNOSIS — F90.2 ATTENTION DEFICIT HYPERACTIVITY DISORDER, COMBINED TYPE: Primary | ICD-10-CM

## 2025-08-04 PROCEDURE — 96127 BRIEF EMOTIONAL/BEHAV ASSMT: CPT | Performed by: PHYSICIAN ASSISTANT

## 2025-08-04 PROCEDURE — 99213 OFFICE O/P EST LOW 20 MIN: CPT | Performed by: PHYSICIAN ASSISTANT

## 2025-08-18 DIAGNOSIS — F90.2 ATTENTION DEFICIT HYPERACTIVITY DISORDER, COMBINED TYPE: ICD-10-CM

## 2025-08-18 RX ORDER — DEXTROAMPHETAMINE SACCHARATE, AMPHETAMINE ASPARTATE MONOHYDRATE, DEXTROAMPHETAMINE SULFATE AND AMPHETAMINE SULFATE 5; 5; 5; 5 MG/1; MG/1; MG/1; MG/1
20 CAPSULE, EXTENDED RELEASE ORAL EVERY MORNING
Qty: 30 CAPSULE | Refills: 0 | Status: CANCELLED | OUTPATIENT
Start: 2025-08-18 | End: 2025-09-17

## 2025-08-22 ENCOUNTER — TELEPHONE (OUTPATIENT)
Dept: PSYCHIATRY | Facility: CLINIC | Age: 40
End: 2025-08-22
Payer: COMMERCIAL

## 2025-08-22 DIAGNOSIS — F90.2 ATTENTION DEFICIT HYPERACTIVITY DISORDER, COMBINED TYPE: ICD-10-CM

## 2025-08-22 RX ORDER — DEXTROAMPHETAMINE SACCHARATE, AMPHETAMINE ASPARTATE MONOHYDRATE, DEXTROAMPHETAMINE SULFATE AND AMPHETAMINE SULFATE 5; 5; 5; 5 MG/1; MG/1; MG/1; MG/1
20 CAPSULE, EXTENDED RELEASE ORAL EVERY MORNING
Qty: 30 CAPSULE | Refills: 0 | Status: SHIPPED | OUTPATIENT
Start: 2025-08-22 | End: 2025-09-21